# Patient Record
Sex: FEMALE | Race: WHITE | NOT HISPANIC OR LATINO | Employment: OTHER | URBAN - METROPOLITAN AREA
[De-identification: names, ages, dates, MRNs, and addresses within clinical notes are randomized per-mention and may not be internally consistent; named-entity substitution may affect disease eponyms.]

---

## 2019-07-05 ENCOUNTER — OFFICE VISIT (OUTPATIENT)
Dept: URGENT CARE | Facility: CLINIC | Age: 54
End: 2019-07-05
Payer: COMMERCIAL

## 2019-07-05 VITALS
BODY MASS INDEX: 24.45 KG/M2 | HEART RATE: 88 BPM | HEIGHT: 63 IN | TEMPERATURE: 97.1 F | SYSTOLIC BLOOD PRESSURE: 148 MMHG | OXYGEN SATURATION: 98 % | DIASTOLIC BLOOD PRESSURE: 80 MMHG | WEIGHT: 138 LBS | RESPIRATION RATE: 18 BRPM

## 2019-07-05 DIAGNOSIS — S51.011A LACERATION OF SKIN OF RIGHT ELBOW, INITIAL ENCOUNTER: Primary | ICD-10-CM

## 2019-07-05 PROCEDURE — S9083 URGENT CARE CENTER GLOBAL: HCPCS | Performed by: PHYSICIAN ASSISTANT

## 2019-07-05 PROCEDURE — 99203 OFFICE O/P NEW LOW 30 MIN: CPT | Performed by: PHYSICIAN ASSISTANT

## 2019-07-05 RX ORDER — ALENDRONATE SODIUM 70 MG/1
70 TABLET ORAL WEEKLY
Refills: 3 | COMMUNITY
Start: 2019-06-22

## 2019-07-05 RX ORDER — PERMETHRIN 50 MG/G
CREAM TOPICAL
Refills: 0 | COMMUNITY
Start: 2019-06-25

## 2019-07-05 RX ORDER — DOXYCYCLINE HYCLATE 100 MG/1
CAPSULE ORAL
Refills: 0 | COMMUNITY
Start: 2019-07-01

## 2019-07-05 RX ORDER — GABAPENTIN 300 MG/1
CAPSULE ORAL
Refills: 2 | COMMUNITY
Start: 2019-06-27

## 2019-07-05 RX ORDER — LETROZOLE 2.5 MG/1
2.5 TABLET, FILM COATED ORAL DAILY
Refills: 1 | COMMUNITY
Start: 2019-06-27

## 2019-07-05 RX ORDER — CITALOPRAM 40 MG/1
40 TABLET ORAL DAILY
Refills: 2 | COMMUNITY
Start: 2019-06-27

## 2019-07-05 RX ORDER — HYDROXYZINE 50 MG/1
TABLET, FILM COATED ORAL
Refills: 1 | COMMUNITY
Start: 2019-04-02

## 2019-07-05 RX ORDER — BUPROPION HYDROCHLORIDE 150 MG/1
150 TABLET ORAL DAILY
Refills: 2 | COMMUNITY
Start: 2019-05-31

## 2019-07-05 RX ORDER — ERGOCALCIFEROL 1.25 MG/1
CAPSULE ORAL
Refills: 10 | COMMUNITY
Start: 2019-04-07

## 2019-07-05 NOTE — PROGRESS NOTES
[facility]    NAME: Glenn Naylor is a 52 y o  female  : 1965    MRN: [id number]  DATE: 2019  TIME: 3:54 PM    Assessment and Plan   Laceration of skin of right elbow, initial encounter [S51 011A]  1  Laceration of skin of right elbow, initial encounter         Patient Instructions     Patient Instructions   Advised to cut steri strips as they start to peel off  Continue Doxycycline as directed  If there are any signs of infection, should be rechecked  Keep clean and dry  No swimming  Chief Complaint     Chief Complaint   Patient presents with    Extremity Laceration     C/O laceration to right elbow after falling into an outdoor fire pit/grill after fallling last night  Pt has neuropathy and does fall  Pt also c/o pain low back as well  Bruising is noted on area  Pt states that TDap was given in 2016  History of Present Illness   52 Year old female here with laceration to her right elbow  Patient cut it last night  Carol Humphrey into a fire pit  Patient has neuropathy of her feet which caused her to fall  Has a small burn on her one finger which she states is fine  Has some bruising on her lower back but states she is able to bend with no problems  Laceration was sustained over 20 hours ago  Has full ROM of her elbow  Review of Systems   Review of Systems   Constitutional: Negative for chills and fever  Respiratory: Negative for shortness of breath  Cardiovascular: Negative for chest pain  Musculoskeletal: Positive for back pain (bruise on lower back)  Skin: Positive for wound  All other systems reviewed and are negative        Current Medications     Current Outpatient Medications:     alendronate (FOSAMAX) 70 mg tablet, Take 70 mg by mouth once a week, Disp: , Rfl: 3    buPROPion (WELLBUTRIN XL) 150 mg 24 hr tablet, Take 150 mg by mouth daily, Disp: , Rfl: 2    citalopram (CeleXA) 40 mg tablet, Take 40 mg by mouth daily, Disp: , Rfl: 2    doxycycline hyclate (VIBRAMYCIN) 100 mg capsule, TAKE 1 CAPSULE BY MOUTH TWICE DAILY FOR 14 DAYS, Disp: , Rfl: 0    ergocalciferol (VITAMIN D2) 50,000 units, TK 1 C PO Q WEEK, Disp: , Rfl: 10    gabapentin (NEURONTIN) 300 mg capsule, TAKE 3 CAPSULES BY MOUTH THREE TIMES DAILY, Disp: , Rfl: 2    hydrOXYzine HCL (ATARAX) 50 mg tablet, , Disp: , Rfl: 1    letrozole (FEMARA) 2 5 mg tablet, Take 2 5 mg by mouth daily, Disp: , Rfl: 1    permethrin (ELIMITE) 5 % cream, APPLY CREAM TOPICALLY ONCE DAILY, Disp: , Rfl: 0    Current Allergies     Allergies as of 07/05/2019 - Reviewed 07/05/2019   Allergen Reaction Noted    Penicillins Anaphylaxis 07/05/2019    Nsaids  07/05/2019          The following portions of the patient's history were reviewed and updated as appropriate: allergies, current medications, past family history, past medical history, past social history, past surgical history and problem list    Past Medical History:   Diagnosis Date    Bacterial infection of gastrointestinal tract     Brain aneurysm     h/o    Cancer ([facility] [address] )     breast Stage 3    Lyme disease     Neuropathy      Past Surgical History:   Procedure Laterality Date    CHOLECYSTECTOMY      CRANIOTOMY      MASTECTOMY Bilateral      History reviewed  No pertinent family history    Social History     Socioeconomic History    Marital status:      Spouse name: Not on file    Number of children: Not on file    Years of education: Not on file    Highest education level: Not on file   Occupational History    Not on file   Social Needs    Financial resource strain: Not on file    Food insecurity:     Worry: Not on file     Inability: Not on file    Transportation needs:     Medical: Not on file     Non-medical: Not on file   Tobacco Use    Smoking status: Current Every Day Smoker     Types: Cigarettes    Smokeless tobacco: Never Used   Substance and Sexual Activity    Alcohol use: Not on file    Drug use: Not on file    Sexual activity: Not on file   Lifestyle    Physical activity:     Days per week: Not on file     Minutes per session: Not on file    Stress: Not on file   Relationships    Social connections:     Talks on phone: Not on file     Gets together: Not on file     Attends Spiritism service: Not on file     Active member of club or organization: Not on file     Attends meetings of clubs or organizations: Not on file     Relationship status: Not on file    Intimate partner violence:     Fear of current or ex partner: Not on file     Emotionally abused: Not on file     Physically abused: Not on file     Forced sexual activity: Not on file   Other Topics Concern    Not on file   Social History Narrative    Not on file     Medications have been verified  Objective   /80   Pulse 88   Temp (!) 97 1 °F (36 2 °C) (Tympanic)   Resp 18   Ht 5' 3" (1 6 m)   Wt 62 6 kg (138 lb)   SpO2 98%   BMI 24 45 kg/m²      Physical Exam   Physical Exam   Constitutional: She appears well-developed and well-nourished  No distress  Cardiovascular: Normal rate, regular rhythm and normal heart sounds  Pulmonary/Chest: Effort normal and breath sounds normal    Musculoskeletal:        Lumbar back: She exhibits tenderness (mild ecchymosis)  She exhibits normal range of motion and no bony tenderness  Skin:        Nursing note and vitals reviewed        Wound cleansed and dressed with steristrips

## 2019-07-05 NOTE — PATIENT INSTRUCTIONS
Advised to cut steri strips as they start to peel off  Continue Doxycycline as directed  If there are any signs of infection, should be rechecked  Keep clean and dry  No swimming